# Patient Record
Sex: FEMALE | Race: WHITE | NOT HISPANIC OR LATINO | Employment: FULL TIME | URBAN - METROPOLITAN AREA
[De-identification: names, ages, dates, MRNs, and addresses within clinical notes are randomized per-mention and may not be internally consistent; named-entity substitution may affect disease eponyms.]

---

## 2023-11-07 DIAGNOSIS — C73 THYROID CANCER (MULTI): Primary | ICD-10-CM

## 2023-11-07 RX ORDER — LEVOTHYROXINE SODIUM 125 UG/1
TABLET ORAL EVERY 24 HOURS
COMMUNITY
Start: 2021-12-06 | End: 2023-11-07 | Stop reason: SDUPTHER

## 2023-11-07 RX ORDER — LEVOTHYROXINE SODIUM 125 UG/1
125 TABLET ORAL
Qty: 90 TABLET | Refills: 0 | Status: SHIPPED | OUTPATIENT
Start: 2023-11-07 | End: 2023-11-13 | Stop reason: SDUPTHER

## 2023-11-11 PROBLEM — C73 THYROID CANCER (MULTI): Status: ACTIVE | Noted: 2023-11-11

## 2023-11-11 PROBLEM — E89.0 POST-SURGICAL HYPOTHYROIDISM: Status: ACTIVE | Noted: 2023-11-11

## 2023-11-11 PROBLEM — J45.20 INTERMITTENT ASTHMA WITHOUT COMPLICATION (HHS-HCC): Status: ACTIVE | Noted: 2023-11-11

## 2023-11-11 PROBLEM — E89.0 HISTORY OF TOTAL THYROIDECTOMY: Status: ACTIVE | Noted: 2023-11-11

## 2023-11-11 PROBLEM — J45.901 EXACERBATION OF ASTHMA (HHS-HCC): Status: ACTIVE | Noted: 2023-11-11

## 2023-11-11 PROBLEM — J34.89 RHINORRHEA: Status: ACTIVE | Noted: 2018-08-28

## 2023-11-11 PROBLEM — H65.193 ACUTE EFFUSION OF BOTH MIDDLE EARS: Status: ACTIVE | Noted: 2019-10-30

## 2023-11-11 PROBLEM — Z90.89 HISTORY OF TOTAL THYROIDECTOMY: Status: ACTIVE | Noted: 2023-11-11

## 2023-11-11 PROBLEM — R59.0 CERVICAL LYMPHADENOPATHY: Status: ACTIVE | Noted: 2019-10-30

## 2023-11-11 PROBLEM — G43.009 MIGRAINE WITHOUT AURA AND RESPONSIVE TO TREATMENT: Status: ACTIVE | Noted: 2019-10-30

## 2023-11-11 PROBLEM — E03.9 HYPOTHYROIDISM (ACQUIRED): Status: ACTIVE | Noted: 2023-11-11

## 2023-11-11 PROBLEM — N80.9 ENDOMETRIOSIS: Status: ACTIVE | Noted: 2023-11-11

## 2023-11-11 PROBLEM — R53.83 FATIGUE: Status: ACTIVE | Noted: 2023-11-11

## 2023-11-11 PROBLEM — R63.1 INCREASED THIRST: Status: ACTIVE | Noted: 2023-11-11

## 2023-11-11 PROBLEM — N92.6 IRREGULAR MENSTRUAL CYCLE: Status: ACTIVE | Noted: 2023-11-11

## 2023-11-11 PROBLEM — M79.7 FIBROMYALGIA: Status: ACTIVE | Noted: 2023-11-11

## 2023-11-11 PROBLEM — N60.11 DIFFUSE CYSTIC MASTOPATHY OF RIGHT BREAST: Status: ACTIVE | Noted: 2023-11-11

## 2023-11-11 PROBLEM — J45.909 ASTHMATIC BRONCHITIS (HHS-HCC): Status: ACTIVE | Noted: 2023-11-11

## 2023-11-11 PROBLEM — H92.03 OTALGIA, BILATERAL: Status: ACTIVE | Noted: 2019-10-30

## 2023-11-11 PROBLEM — E55.9 VITAMIN D DEFICIENCY: Status: ACTIVE | Noted: 2023-11-11

## 2023-11-11 PROBLEM — E04.1 THYROID NODULE: Status: ACTIVE | Noted: 2023-11-11

## 2023-11-11 PROBLEM — G47.9 SLEEP DISTURBANCE: Status: ACTIVE | Noted: 2023-11-11

## 2023-11-11 PROBLEM — R10.2 CHRONIC PELVIC PAIN IN FEMALE: Status: ACTIVE | Noted: 2023-11-11

## 2023-11-11 PROBLEM — S09.90XA INJURY OF HEAD: Status: ACTIVE | Noted: 2023-11-11

## 2023-11-11 PROBLEM — G89.29 CHRONIC PELVIC PAIN IN FEMALE: Status: ACTIVE | Noted: 2023-11-11

## 2023-11-11 PROBLEM — G43.709 CHRONIC MIGRAINE WITHOUT AURA WITHOUT STATUS MIGRAINOSUS, NOT INTRACTABLE: Status: ACTIVE | Noted: 2023-11-11

## 2023-11-11 PROBLEM — Z90.710 HISTORY OF HYSTERECTOMY: Status: ACTIVE | Noted: 2023-11-11

## 2023-11-11 PROBLEM — G47.09 OTHER INSOMNIA: Status: ACTIVE | Noted: 2023-11-11

## 2023-11-11 PROBLEM — E78.00 PURE HYPERCHOLESTEROLEMIA: Status: ACTIVE | Noted: 2023-11-11

## 2023-11-11 PROBLEM — R93.89 ABNORMAL PELVIC ULTRASOUND: Status: ACTIVE | Noted: 2023-11-11

## 2023-11-11 PROBLEM — J45.901 MODERATE ASTHMA WITH EXACERBATION (HHS-HCC): Status: ACTIVE | Noted: 2023-11-11

## 2023-11-11 PROBLEM — Z98.890 HISTORY OF TOTAL THYROIDECTOMY: Status: ACTIVE | Noted: 2023-11-11

## 2023-11-11 PROBLEM — E04.2 GOITER, NONTOXIC, MULTINODULAR: Status: ACTIVE | Noted: 2023-11-11

## 2023-11-11 PROBLEM — N63.10 LUMP OF RIGHT BREAST: Status: ACTIVE | Noted: 2023-11-11

## 2023-11-11 PROBLEM — R92.8 ABNORMAL MAMMOGRAM: Status: ACTIVE | Noted: 2023-11-11

## 2023-11-11 RX ORDER — FLUTICASONE PROPIONATE 50 MCG
1 SPRAY, SUSPENSION (ML) NASAL DAILY
COMMUNITY
End: 2024-05-14 | Stop reason: WASHOUT

## 2023-11-11 RX ORDER — BECLOMETHASONE DIPROPIONATE HFA 40 UG/1
1 AEROSOL, METERED RESPIRATORY (INHALATION)
COMMUNITY
Start: 2021-12-17

## 2023-11-11 RX ORDER — VALACYCLOVIR HYDROCHLORIDE 500 MG/1
500 TABLET, FILM COATED ORAL 2 TIMES DAILY
COMMUNITY

## 2023-11-11 RX ORDER — CYANOCOBALAMIN (VITAMIN B-12) 250 MCG
1 TABLET ORAL DAILY
COMMUNITY

## 2023-11-11 RX ORDER — LORATADINE 10 MG/1
1 TABLET ORAL DAILY
COMMUNITY

## 2023-11-11 RX ORDER — ALBUTEROL SULFATE 90 UG/1
AEROSOL, METERED RESPIRATORY (INHALATION)
COMMUNITY
End: 2024-05-14 | Stop reason: WASHOUT

## 2023-11-11 RX ORDER — IPRATROPIUM BROMIDE AND ALBUTEROL SULFATE 2.5; .5 MG/3ML; MG/3ML
3 SOLUTION RESPIRATORY (INHALATION) 4 TIMES DAILY PRN
COMMUNITY
Start: 2018-08-22

## 2023-11-11 RX ORDER — DULOXETIN HYDROCHLORIDE 60 MG/1
1 CAPSULE, DELAYED RELEASE ORAL DAILY
COMMUNITY
Start: 2021-04-06 | End: 2023-11-13 | Stop reason: ALTCHOICE

## 2023-11-11 RX ORDER — ESCITALOPRAM OXALATE 20 MG/1
20 TABLET ORAL DAILY
COMMUNITY
End: 2024-05-14 | Stop reason: WASHOUT

## 2023-11-11 RX ORDER — ACETAMINOPHEN, ASPIRIN (NSAID), AND CAFFEINE 250; 250; 65 MG/1; MG/1; MG/1
2 TABLET, FILM COATED ORAL 2 TIMES WEEKLY
COMMUNITY
Start: 2021-09-29

## 2023-11-11 RX ORDER — IBUPROFEN 200 MG
800 TABLET ORAL
COMMUNITY
Start: 2021-09-29

## 2023-11-11 RX ORDER — ALBUTEROL SULFATE 0.83 MG/ML
SOLUTION RESPIRATORY (INHALATION)
COMMUNITY
Start: 2020-12-04

## 2023-11-11 RX ORDER — CYCLOBENZAPRINE HCL 10 MG
10 TABLET ORAL NIGHTLY PRN
COMMUNITY
Start: 2018-08-22 | End: 2023-11-13 | Stop reason: ALTCHOICE

## 2023-11-11 RX ORDER — CHOLECALCIFEROL (VITAMIN D3) 50 MCG
50 TABLET ORAL
COMMUNITY
End: 2023-11-13 | Stop reason: ALTCHOICE

## 2023-11-11 RX ORDER — MOMETASONE FUROATE 50 UG/1
2 SPRAY, METERED NASAL DAILY
COMMUNITY
Start: 2021-04-08

## 2023-11-11 RX ORDER — ESCITALOPRAM OXALATE 10 MG/1
1 TABLET ORAL DAILY
COMMUNITY
End: 2024-05-14 | Stop reason: WASHOUT

## 2023-11-11 RX ORDER — NORETHINDRONE 5 MG/1
5 TABLET ORAL DAILY
COMMUNITY
End: 2024-05-14 | Stop reason: WASHOUT

## 2023-11-11 RX ORDER — ACETAMINOPHEN 325 MG/1
650 TABLET ORAL EVERY 6 HOURS PRN
COMMUNITY
Start: 2021-08-24

## 2023-11-13 ENCOUNTER — OFFICE VISIT (OUTPATIENT)
Dept: ENDOCRINOLOGY | Facility: CLINIC | Age: 38
End: 2023-11-13
Payer: COMMERCIAL

## 2023-11-13 VITALS
RESPIRATION RATE: 16 BRPM | HEART RATE: 68 BPM | WEIGHT: 190 LBS | DIASTOLIC BLOOD PRESSURE: 64 MMHG | SYSTOLIC BLOOD PRESSURE: 122 MMHG | HEIGHT: 69 IN | BODY MASS INDEX: 28.14 KG/M2

## 2023-11-13 DIAGNOSIS — E89.0 POST-SURGICAL HYPOTHYROIDISM: Primary | ICD-10-CM

## 2023-11-13 DIAGNOSIS — C73 THYROID CANCER (MULTI): ICD-10-CM

## 2023-11-13 PROCEDURE — 1036F TOBACCO NON-USER: CPT | Performed by: INTERNAL MEDICINE

## 2023-11-13 PROCEDURE — 99213 OFFICE O/P EST LOW 20 MIN: CPT | Performed by: INTERNAL MEDICINE

## 2023-11-13 RX ORDER — LEVOTHYROXINE SODIUM 125 UG/1
125 TABLET ORAL
Qty: 90 TABLET | Refills: 3 | Status: SHIPPED | OUTPATIENT
Start: 2023-11-13 | End: 2024-05-14 | Stop reason: SDUPTHER

## 2023-11-13 ASSESSMENT — ENCOUNTER SYMPTOMS
HEADACHES: 0
VOMITING: 0
COUGH: 0
FATIGUE: 0
CHILLS: 0
NAUSEA: 0
DIARRHEA: 0
FEVER: 0
SHORTNESS OF BREATH: 0
PALPITATIONS: 0

## 2023-11-13 NOTE — PROGRESS NOTES
"Subjective   Patient ID: Vonnie Velez is a 38 y.o. female who presents for Hypothyroidism and Thyroid Cancer.  HPI  Since last visit adjusted t4 to x6 in January due to high levels.  Right now dealing with URI and ran out of t4.  Feeling ok beyond uri.   Not happy at current job, regrets change.   Neck feels tight with uri    Total tx completed in August (8/23/2021) Path showed dominant nodules benign but had incidental 5 mm PTC in the isthmus as well as a + LN in the area.   10/2021 [facility] 100 mci  5/2023 ultrasound nonspecific ln's    Review of Systems   Constitutional:  Negative for chills, fatigue and fever.   Respiratory:  Negative for cough and shortness of breath.    Cardiovascular:  Negative for chest pain and palpitations.   Gastrointestinal:  Negative for diarrhea, nausea and vomiting.   Neurological:  Negative for headaches.       Objective   11/13/2023 11:04 AM    /64   Pulse 68   Resp 16   Weight 86.2 kg (190 lb)   Height 1.746 m (5' 8.75\")         Physical Exam  Constitutional:       Appearance: Normal appearance. She is overweight.   HENT:      Head: Normocephalic and atraumatic.   Neck:      Comments: No palpable thyroid gland  Cardiovascular:      Rate and Rhythm: Normal rate and regular rhythm.      Heart sounds: No murmur heard.     No gallop.   Pulmonary:      Effort: Pulmonary effort is normal.      Breath sounds: Normal breath sounds.   Abdominal:      Palpations: Abdomen is soft.      Comments: benign   Lymphadenopathy:      Cervical: No cervical adenopathy.   Neurological:      General: No focal deficit present.      Mental Status: She is alert and oriented to person, place, and time.      Deep Tendon Reflexes: Reflexes are normal and symmetric.   Psychiatric:         Behavior: Behavior is cooperative.         Assessment/Plan   Problem List Items Addressed This Visit             ICD-10-CM    Post-surgical hypothyroidism - Primary E89.0    Thyroid cancer (CMS/HCC) C73     Due for labs but " off for a week.  Will refill and continue current dose.  Labs in 4-6 weeks with tg.    Ultrasound due next year  Follow up in one year

## 2024-01-09 ENCOUNTER — LAB (OUTPATIENT)
Dept: LAB | Facility: LAB | Age: 39
End: 2024-01-09
Payer: COMMERCIAL

## 2024-01-09 DIAGNOSIS — C73 THYROID CANCER (MULTI): ICD-10-CM

## 2024-01-09 LAB
T4 FREE SERPL-MCNC: 1.01 NG/DL (ref 0.78–1.48)
TSH SERPL-ACNC: 3.85 MIU/L (ref 0.44–3.98)

## 2024-01-09 PROCEDURE — 84432 ASSAY OF THYROGLOBULIN: CPT

## 2024-01-09 PROCEDURE — 36415 COLL VENOUS BLD VENIPUNCTURE: CPT

## 2024-01-09 PROCEDURE — 84439 ASSAY OF FREE THYROXINE: CPT

## 2024-01-09 PROCEDURE — 84443 ASSAY THYROID STIM HORMONE: CPT

## 2024-01-09 PROCEDURE — 86800 THYROGLOBULIN ANTIBODY: CPT

## 2024-01-12 DIAGNOSIS — E89.0 POST-SURGICAL HYPOTHYROIDISM: Primary | ICD-10-CM

## 2024-01-12 LAB
BILL ONLY-THYROGLOBULIN: NORMAL
THYROGLOB AB SERPL-ACNC: <0.9 IU/ML (ref 0–4)
THYROGLOB SERPL-MCNC: <0.1 NG/ML (ref 1.3–31.8)
THYROGLOB SERPL-MCNC: ABNORMAL NG/ML (ref 1.3–31.8)

## 2024-05-09 ENCOUNTER — LAB (OUTPATIENT)
Dept: LAB | Facility: LAB | Age: 39
End: 2024-05-09
Payer: COMMERCIAL

## 2024-05-09 DIAGNOSIS — E89.0 POST-SURGICAL HYPOTHYROIDISM: ICD-10-CM

## 2024-05-09 LAB
T4 FREE SERPL-MCNC: 1.13 NG/DL (ref 0.78–1.48)
TSH SERPL-ACNC: 0.11 MIU/L (ref 0.44–3.98)

## 2024-05-09 PROCEDURE — 84443 ASSAY THYROID STIM HORMONE: CPT

## 2024-05-09 PROCEDURE — 36415 COLL VENOUS BLD VENIPUNCTURE: CPT

## 2024-05-09 PROCEDURE — 84439 ASSAY OF FREE THYROXINE: CPT

## 2024-05-13 ENCOUNTER — APPOINTMENT (OUTPATIENT)
Dept: ENDOCRINOLOGY | Facility: CLINIC | Age: 39
End: 2024-05-13
Payer: COMMERCIAL

## 2024-05-14 ENCOUNTER — OFFICE VISIT (OUTPATIENT)
Dept: ENDOCRINOLOGY | Facility: CLINIC | Age: 39
End: 2024-05-14
Payer: COMMERCIAL

## 2024-05-14 VITALS
SYSTOLIC BLOOD PRESSURE: 112 MMHG | RESPIRATION RATE: 16 BRPM | WEIGHT: 181.8 LBS | BODY MASS INDEX: 26.93 KG/M2 | HEART RATE: 80 BPM | DIASTOLIC BLOOD PRESSURE: 70 MMHG | HEIGHT: 69 IN

## 2024-05-14 DIAGNOSIS — C73 THYROID CANCER (MULTI): Primary | ICD-10-CM

## 2024-05-14 DIAGNOSIS — E89.0 POST-SURGICAL HYPOTHYROIDISM: ICD-10-CM

## 2024-05-14 PROCEDURE — 99213 OFFICE O/P EST LOW 20 MIN: CPT | Performed by: INTERNAL MEDICINE

## 2024-05-14 PROCEDURE — 1036F TOBACCO NON-USER: CPT | Performed by: INTERNAL MEDICINE

## 2024-05-14 RX ORDER — LEVOTHYROXINE SODIUM 112 UG/1
112 TABLET ORAL
Qty: 90 TABLET | Refills: 3 | Status: SHIPPED | OUTPATIENT
Start: 2024-05-14 | End: 2025-05-14

## 2024-05-14 ASSESSMENT — ENCOUNTER SYMPTOMS
DIARRHEA: 0
NAUSEA: 0
SHORTNESS OF BREATH: 0
PALPITATIONS: 0
FEVER: 0
VOMITING: 0
CHILLS: 0
COUGH: 0
HEADACHES: 0
FATIGUE: 0

## 2024-05-14 NOTE — PROGRESS NOTES
Endocrinology: Follow up visit  Subjective   Patient ID: Vonnie Velez is a 38 y.o. female who presents for Hypothyroidism and Thyroid Cancer.    PCP: Jn House MD    HPI  Since last visit taking t4 125 mcg daily.  Feeling a little anxious.  Has new job and much more active, down 10 lbs.  No neck complaints    Total tx completed in August (8/23/2021) Path showed dominant nodules benign but had incidental 5 mm PTC in the isthmus as well as a + LN in the area.   10/2021 [facility] 100 mci  5/2023 ultrasound nonspecific ln's        Review of Systems   Constitutional:  Negative for chills, fatigue and fever.   Respiratory:  Negative for cough and shortness of breath.    Cardiovascular:  Negative for chest pain and palpitations.   Gastrointestinal:  Negative for diarrhea, nausea and vomiting.   Neurological:  Negative for headaches.       Patient Active Problem List   Diagnosis    Abnormal mammogram    Abnormal pelvic ultrasound    Acute effusion of both middle ears    Exacerbation of asthma (HHS-HCC)    Asthmatic bronchitis (HHS-HCC)    Intermittent asthma without complication (HHS-HCC)    Moderate asthma with exacerbation (HHS-HCC)    Cervical lymphadenopathy    Chronic migraine without aura without status migrainosus, not intractable    Migraine without aura and responsive to treatment    Chronic pelvic pain in female    Fibromyalgia    Diffuse cystic mastopathy of right breast    Lump of right breast    Endometriosis    Fatigue    Goiter, nontoxic, multinodular    History of hysterectomy    History of total thyroidectomy    Hypothyroidism (acquired)    Post-surgical hypothyroidism    Increased thirst    Injury of head    Irregular menstrual cycle    Otalgia, bilateral    Other insomnia    Pure hypercholesterolemia    Rhinorrhea    Sleep disturbance    Thyroid cancer (Multi)    Thyroid nodule    Vitamin D deficiency        Home Meds:  Current Outpatient Medications   Medication Instructions    acetaminophen  (TYLENOL) 650 mg, oral, Every 6 hours PRN    albuterol 2.5 mg /3 mL (0.083 %) nebulizer solution 3 ml as needed Inhalation every 4-6 hrs prn for 90 days    aspirin-acetaminophen-caffeine (Excedrin Extra Strength) 250-250-65 mg tablet 2 tablets, oral, 2 times weekly, PRN    beclomethasone HFA (Qvar RediHaler) 40 mcg/actuation inhaler 1 puff, inhalation, 2 times daily RT,  1 puff Inhalation bid . rinse mouth after use. ok for formulary subsitution. for 90 days<BR>    cyanocobalamin (Vitamin B-12) 250 mcg tablet 1 tablet, oral, Daily    ibuprofen 800 mg, oral,  4 tablets by mouth one to two times weekly as needed as directed    ipratropium-albuteroL (Duo-Neb) 0.5-2.5 mg/3 mL nebulizer solution 3 mL, nebulization, 4 times daily PRN    levothyroxine (SYNTHROID, LEVOXYL) 125 mcg, oral, Daily before breakfast    loratadine (Claritin) 10 mg tablet 1 tablet, oral, Daily    mometasone (Nasonex) 50 mcg/actuation nasal spray 2 sprays, Each Nostril, Daily    valACYclovir (VALTREX) 500 mg, oral, 2 times daily        Allergies   Allergen Reactions    Wool Anaphylaxis and Hives    Codeine Hives and Other    Oxycodone Hcl-Oxycodone-Asa Hives    Fluconazole Unknown    Metoclopramide Unknown    Oxycodone Unknown    Tomato Other     Bumps on back of throat     Other Other        Objective   Vitals:    05/14/24 1158   BP: 112/70   Pulse: 80   Resp: 16      Vitals:    05/14/24 1158   Weight: 82.5 kg (181 lb 12.8 oz)      Body mass index is 27.04 kg/m².   Physical Exam  Constitutional:       Appearance: Normal appearance. She is overweight.   HENT:      Head: Normocephalic and atraumatic.   Neck:      Thyroid: No thyroid mass, thyromegaly or thyroid tenderness.   Cardiovascular:      Rate and Rhythm: Normal rate and regular rhythm.      Heart sounds: No murmur heard.     No gallop.   Pulmonary:      Effort: Pulmonary effort is normal.      Breath sounds: Normal breath sounds.   Abdominal:      Palpations: Abdomen is soft.      Comments:  "benign   Neurological:      General: No focal deficit present.      Mental Status: She is alert and oriented to person, place, and time.      Deep Tendon Reflexes: Reflexes are normal and symmetric.   Psychiatric:         Behavior: Behavior is cooperative.         Labs:  Lab Results   Component Value Date    TSH 0.11 (L) 05/09/2024    FREET4 1.13 05/09/2024      No results found for: \"PR1\", \"THYROIDPAB\", \"TSI\"     Assessment/Plan   Problem List Items Addressed This Visit       Post-surgical hypothyroidism    Thyroid cancer (Multi) - Primary     Discussed course and labs  With weight loss tsh now a bit over suppressed and she is feeling it a bit: will reduce to 112 mcg.   Recheck labs in 2-3 months  Also due for yearly ultrasound  Follow up in one year    Electronically signed by:  Charlene Smith MD 05/14/24 1:05 PM              "

## 2024-05-14 NOTE — PATIENT INSTRUCTIONS
Decrease thyroid med to 112 mcg daily  Recheck labs in 2-3 months  Schedule ultrasound  Follow up in one year

## 2024-05-23 ENCOUNTER — HOSPITAL ENCOUNTER (OUTPATIENT)
Dept: RADIOLOGY | Facility: CLINIC | Age: 39
Discharge: HOME | End: 2024-05-23
Payer: COMMERCIAL

## 2024-05-23 DIAGNOSIS — E89.0 POST-SURGICAL HYPOTHYROIDISM: ICD-10-CM

## 2024-05-23 PROCEDURE — 76536 US EXAM OF HEAD AND NECK: CPT

## 2024-05-23 PROCEDURE — 76536 US EXAM OF HEAD AND NECK: CPT | Performed by: RADIOLOGY

## 2025-01-29 ENCOUNTER — TELEPHONE (OUTPATIENT)
Dept: ENDOCRINOLOGY | Facility: CLINIC | Age: 40
End: 2025-01-29
Payer: COMMERCIAL

## 2025-01-29 DIAGNOSIS — C73 THYROID CANCER (MULTI): Primary | ICD-10-CM

## 2025-01-29 NOTE — TELEPHONE ENCOUNTER
Patient states that she is feeling jittery and has some nausea and not sleeping. Pt would like to have her thyroid levels checked. I see there are orders in chart do you want all those tests done?

## 2025-02-02 LAB
T4 FREE SERPL-MCNC: 1.3 NG/DL (ref 0.8–1.8)
THYROGLOB AB SERPL-ACNC: NORMAL [IU]/ML
TSH SERPL-ACNC: 0.32 MIU/L

## 2025-02-05 LAB
T4 FREE SERPL-MCNC: 1.3 NG/DL (ref 0.8–1.8)
THYROGLOB AB SERPL-ACNC: <1 IU/ML
THYROGLOB SERPL-MCNC: <0.1 NG/ML
TSH SERPL-ACNC: 0.32 MIU/L

## 2025-05-27 ENCOUNTER — APPOINTMENT (OUTPATIENT)
Facility: CLINIC | Age: 40
End: 2025-05-27
Payer: COMMERCIAL

## 2025-05-27 VITALS
RESPIRATION RATE: 16 BRPM | DIASTOLIC BLOOD PRESSURE: 70 MMHG | BODY MASS INDEX: 27.37 KG/M2 | HEART RATE: 68 BPM | SYSTOLIC BLOOD PRESSURE: 122 MMHG | HEIGHT: 69 IN | WEIGHT: 184.8 LBS

## 2025-05-27 DIAGNOSIS — C73 THYROID CANCER (MULTI): ICD-10-CM

## 2025-05-27 DIAGNOSIS — E89.0 POST-SURGICAL HYPOTHYROIDISM: Primary | ICD-10-CM

## 2025-05-27 PROCEDURE — 99213 OFFICE O/P EST LOW 20 MIN: CPT | Performed by: INTERNAL MEDICINE

## 2025-05-27 PROCEDURE — 3008F BODY MASS INDEX DOCD: CPT | Performed by: INTERNAL MEDICINE

## 2025-05-27 PROCEDURE — 1036F TOBACCO NON-USER: CPT | Performed by: INTERNAL MEDICINE

## 2025-05-27 RX ORDER — LEVOTHYROXINE SODIUM 100 UG/1
100 TABLET ORAL
Qty: 90 TABLET | Refills: 3 | Status: SHIPPED | OUTPATIENT
Start: 2025-05-27 | End: 2026-05-27

## 2025-05-27 ASSESSMENT — ENCOUNTER SYMPTOMS
HEADACHES: 0
FEVER: 0
DIARRHEA: 0
SHORTNESS OF BREATH: 0
NAUSEA: 0
FATIGUE: 0
VOMITING: 0
CHILLS: 0
COUGH: 0
PALPITATIONS: 0

## 2025-05-27 NOTE — PATIENT INSTRUCTIONS
Schedule ultrasound when able  Adjust thyroid med to 100 mcg tablet one  every day   Follow up in one year

## 2025-05-27 NOTE — PROGRESS NOTES
Endocrinology: Follow up visit  Subjective   Patient ID: Vonnie Velez is a 39 y.o. female who presents for Hypothyroidism and Thyroid Cancer.    PCP: Jn House MD    HPI  Total tx completed in August (8/23/2021) Path showed dominant nodules benign but had incidental 5 mm PTC in the isthmus as well as a + LN in the area.   10/2021 [facility] m[facility]  5/2023 ultrasound nonspecific ln's  5/2024 negative us    Since last visit doing fairly well  Taking t4 as directed 6.5 a week: reduced dose a few months ago  Stressed with job switch in the last year that didn't go well and now back to previous position    Review of Systems   Constitutional:  Negative for chills, fatigue and fever.   Respiratory:  Negative for cough and shortness of breath.    Cardiovascular:  Negative for chest pain and palpitations.   Gastrointestinal:  Negative for diarrhea, nausea and vomiting.   Neurological:  Negative for headaches.       Problem List[1]     Home Meds:  Current Outpatient Medications   Medication Instructions    acetaminophen (TYLENOL) 650 mg, Every 6 hours PRN    albuterol 2.5 mg /3 mL (0.083 %) nebulizer solution 3 ml as needed Inhalation every 4-6 hrs prn for 90 days    aspirin-acetaminophen-caffeine (Excedrin Extra Strength) 250-250-65 mg tablet 2 tablets, 2 times weekly    beclomethasone HFA (Qvar RediHaler) 40 mcg/actuation inhaler 1 puff, 2 times daily RT    cyanocobalamin (Vitamin B-12) 250 mcg tablet 1 tablet, Daily    ibuprofen 800 mg    ipratropium-albuteroL (Duo-Neb) 0.5-2.5 mg/3 mL nebulizer solution 3 mL, 4 times daily PRN    levothyroxine (SYNTHROID, LEVOXYL) 112 mcg, oral, Daily before breakfast    loratadine (Claritin) 10 mg tablet 1 tablet, Daily    mometasone (Nasonex) 50 mcg/actuation nasal spray 2 sprays, Daily    valACYclovir (VALTREX) 500 mg, 2 times daily        RX Allergies[2]     Objective   Vitals:    05/27/25 1439   BP: 122/70   Pulse: 68   Resp: 16      Vitals:    05/27/25 1439   Weight: 83.8 kg  "(184 lb 12.8 oz)      Body mass index is 27.49 kg/m².   Physical Exam  Constitutional:       Appearance: Normal appearance. She is overweight.   HENT:      Head: Normocephalic and atraumatic.   Neck:      Comments: No palpable thyroid gland  Cardiovascular:      Rate and Rhythm: Normal rate and regular rhythm.      Heart sounds: No murmur heard.     No gallop.   Pulmonary:      Effort: Pulmonary effort is normal.      Breath sounds: Normal breath sounds.   Abdominal:      Palpations: Abdomen is soft.      Comments: benign   Neurological:      General: No focal deficit present.      Mental Status: She is alert and oriented to person, place, and time.      Deep Tendon Reflexes: Reflexes are normal and symmetric.   Psychiatric:         Behavior: Behavior is cooperative.         Labs:  Lab Results   Component Value Date    TSH 0.47 05/23/2025    FREET4 1.3 02/01/2025      No results found for: \"PR1\", \"THYROIDPAB\", \"TSI\"     Assessment/Plan   Assessment & Plan  Post-surgical hypothyroidism    Tsh looks good  Switch to 100 qd  Thyroid cancer (Multi)    Due for year 4/5 , will order      Electronically signed by:  Charlene Smith MD 05/27/25 2:47 PM                   [1]   Patient Active Problem List  Diagnosis    Abnormal mammogram    Abnormal pelvic ultrasound    Acute effusion of both middle ears    Exacerbation of asthma (HHS-HCC)    Asthmatic bronchitis (HHS-HCC)    Intermittent asthma without complication (HHS-HCC)    Moderate asthma with exacerbation (HHS-HCC)    Cervical lymphadenopathy    Chronic migraine without aura without status migrainosus, not intractable    Migraine without aura and responsive to treatment    Chronic pelvic pain in female    Fibromyalgia    Diffuse cystic mastopathy of right breast    Lump of right breast    Endometriosis    Fatigue    Goiter, nontoxic, multinodular    History of hysterectomy    History of total thyroidectomy    Hypothyroidism (acquired)    Post-surgical hypothyroidism    " Increased thirst    Injury of head    Irregular menstrual cycle    Otalgia, bilateral    Other insomnia    Pure hypercholesterolemia    Rhinorrhea    Sleep disturbance    Thyroid cancer (Multi)    Thyroid nodule    Vitamin D deficiency   [2]   Allergies  Allergen Reactions    Wool Anaphylaxis and Hives    Codeine Hives and Other    Oxycodone Hcl-Oxycodone-Asa Hives    Fluconazole Unknown    Metoclopramide Unknown    Oxycodone Unknown    Tomato Other     Bumps on back of throat     Other Other

## 2025-05-29 ENCOUNTER — APPOINTMENT (OUTPATIENT)
Dept: ENDOCRINOLOGY | Facility: CLINIC | Age: 40
End: 2025-05-29
Payer: COMMERCIAL

## 2025-06-24 ENCOUNTER — HOSPITAL ENCOUNTER (OUTPATIENT)
Dept: RADIOLOGY | Facility: CLINIC | Age: 40
Discharge: HOME | End: 2025-06-24
Payer: COMMERCIAL

## 2025-06-24 DIAGNOSIS — C73 THYROID CANCER (MULTI): ICD-10-CM

## 2025-06-24 PROCEDURE — 76536 US EXAM OF HEAD AND NECK: CPT

## 2025-06-24 PROCEDURE — 76536 US EXAM OF HEAD AND NECK: CPT | Performed by: RADIOLOGY

## 2025-07-02 ENCOUNTER — HOSPITAL ENCOUNTER (OUTPATIENT)
Dept: RADIOLOGY | Facility: CLINIC | Age: 40
Discharge: HOME | End: 2025-07-02
Payer: COMMERCIAL

## 2025-07-02 ENCOUNTER — OFFICE VISIT (OUTPATIENT)
Dept: PRIMARY CARE | Facility: CLINIC | Age: 40
End: 2025-07-02
Payer: COMMERCIAL

## 2025-07-02 VITALS
SYSTOLIC BLOOD PRESSURE: 118 MMHG | HEIGHT: 69 IN | DIASTOLIC BLOOD PRESSURE: 82 MMHG | HEART RATE: 76 BPM | WEIGHT: 188 LBS | BODY MASS INDEX: 27.85 KG/M2 | OXYGEN SATURATION: 98 %

## 2025-07-02 DIAGNOSIS — Z76.89 ENCOUNTER TO ESTABLISH CARE: Primary | ICD-10-CM

## 2025-07-02 DIAGNOSIS — F41.8 ANXIETY WITH DEPRESSION: ICD-10-CM

## 2025-07-02 DIAGNOSIS — M54.2 CERVICAL SPINE PAIN: ICD-10-CM

## 2025-07-02 DIAGNOSIS — G43.709 CHRONIC MIGRAINE WITHOUT AURA WITHOUT STATUS MIGRAINOSUS, NOT INTRACTABLE: ICD-10-CM

## 2025-07-02 DIAGNOSIS — E89.0 POST-SURGICAL HYPOTHYROIDISM: ICD-10-CM

## 2025-07-02 DIAGNOSIS — M54.12 CERVICAL RADICULOPATHY: ICD-10-CM

## 2025-07-02 DIAGNOSIS — J45.20 MILD INTERMITTENT ASTHMA WITHOUT COMPLICATION (HHS-HCC): ICD-10-CM

## 2025-07-02 PROBLEM — N92.6 IRREGULAR MENSTRUAL CYCLE: Status: RESOLVED | Noted: 2023-11-11 | Resolved: 2025-07-02

## 2025-07-02 PROBLEM — R63.1 INCREASED THIRST: Status: RESOLVED | Noted: 2023-11-11 | Resolved: 2025-07-02

## 2025-07-02 PROBLEM — J34.89 RHINORRHEA: Status: RESOLVED | Noted: 2018-08-28 | Resolved: 2025-07-02

## 2025-07-02 PROBLEM — G47.9 SLEEP DISTURBANCE: Status: RESOLVED | Noted: 2023-11-11 | Resolved: 2025-07-02

## 2025-07-02 PROBLEM — R59.0 CERVICAL LYMPHADENOPATHY: Status: RESOLVED | Noted: 2019-10-30 | Resolved: 2025-07-02

## 2025-07-02 PROBLEM — G43.009 MIGRAINE WITHOUT AURA AND RESPONSIVE TO TREATMENT: Status: RESOLVED | Noted: 2019-10-30 | Resolved: 2025-07-02

## 2025-07-02 PROBLEM — R53.83 FATIGUE: Status: RESOLVED | Noted: 2023-11-11 | Resolved: 2025-07-02

## 2025-07-02 PROBLEM — N60.11 DIFFUSE CYSTIC MASTOPATHY OF RIGHT BREAST: Status: RESOLVED | Noted: 2023-11-11 | Resolved: 2025-07-02

## 2025-07-02 PROBLEM — J45.901 EXACERBATION OF ASTHMA (HHS-HCC): Status: RESOLVED | Noted: 2023-11-11 | Resolved: 2025-07-02

## 2025-07-02 PROBLEM — G47.09 OTHER INSOMNIA: Status: RESOLVED | Noted: 2023-11-11 | Resolved: 2025-07-02

## 2025-07-02 PROBLEM — J45.909 ASTHMATIC BRONCHITIS (HHS-HCC): Status: RESOLVED | Noted: 2023-11-11 | Resolved: 2025-07-02

## 2025-07-02 PROBLEM — E04.2 GOITER, NONTOXIC, MULTINODULAR: Status: RESOLVED | Noted: 2023-11-11 | Resolved: 2025-07-02

## 2025-07-02 PROBLEM — R92.8 ABNORMAL MAMMOGRAM: Status: RESOLVED | Noted: 2023-11-11 | Resolved: 2025-07-02

## 2025-07-02 PROBLEM — R93.89 ABNORMAL PELVIC ULTRASOUND: Status: RESOLVED | Noted: 2023-11-11 | Resolved: 2025-07-02

## 2025-07-02 PROBLEM — M79.7 FIBROMYALGIA: Status: RESOLVED | Noted: 2023-11-11 | Resolved: 2025-07-02

## 2025-07-02 PROBLEM — E78.00 PURE HYPERCHOLESTEROLEMIA: Status: RESOLVED | Noted: 2023-11-11 | Resolved: 2025-07-02

## 2025-07-02 PROBLEM — R10.2 CHRONIC PELVIC PAIN IN FEMALE: Status: RESOLVED | Noted: 2023-11-11 | Resolved: 2025-07-02

## 2025-07-02 PROBLEM — H92.03 OTALGIA, BILATERAL: Status: RESOLVED | Noted: 2019-10-30 | Resolved: 2025-07-02

## 2025-07-02 PROBLEM — E03.9 HYPOTHYROIDISM (ACQUIRED): Status: RESOLVED | Noted: 2023-11-11 | Resolved: 2025-07-02

## 2025-07-02 PROBLEM — S09.90XA INJURY OF HEAD: Status: RESOLVED | Noted: 2023-11-11 | Resolved: 2025-07-02

## 2025-07-02 PROBLEM — N80.9 ENDOMETRIOSIS: Status: RESOLVED | Noted: 2023-11-11 | Resolved: 2025-07-02

## 2025-07-02 PROBLEM — E04.1 THYROID NODULE: Status: RESOLVED | Noted: 2023-11-11 | Resolved: 2025-07-02

## 2025-07-02 PROBLEM — C73 THYROID CANCER (MULTI): Status: RESOLVED | Noted: 2023-11-11 | Resolved: 2025-07-02

## 2025-07-02 PROBLEM — H65.193 ACUTE EFFUSION OF BOTH MIDDLE EARS: Status: RESOLVED | Noted: 2019-10-30 | Resolved: 2025-07-02

## 2025-07-02 PROBLEM — G89.29 CHRONIC PELVIC PAIN IN FEMALE: Status: RESOLVED | Noted: 2023-11-11 | Resolved: 2025-07-02

## 2025-07-02 PROBLEM — Z87.42 HX OF OVARIAN CYST: Status: ACTIVE | Noted: 2025-07-02

## 2025-07-02 PROBLEM — N63.10 LUMP OF RIGHT BREAST: Status: RESOLVED | Noted: 2023-11-11 | Resolved: 2025-07-02

## 2025-07-02 PROCEDURE — 72050 X-RAY EXAM NECK SPINE 4/5VWS: CPT | Performed by: RADIOLOGY

## 2025-07-02 PROCEDURE — 72050 X-RAY EXAM NECK SPINE 4/5VWS: CPT

## 2025-07-02 PROCEDURE — 1036F TOBACCO NON-USER: CPT

## 2025-07-02 PROCEDURE — 3008F BODY MASS INDEX DOCD: CPT

## 2025-07-02 PROCEDURE — 99204 OFFICE O/P NEW MOD 45 MIN: CPT

## 2025-07-02 RX ORDER — SERTRALINE HYDROCHLORIDE 50 MG/1
50 TABLET, FILM COATED ORAL DAILY
Qty: 90 TABLET | Refills: 0 | Status: SHIPPED | OUTPATIENT
Start: 2025-07-02

## 2025-07-02 RX ORDER — CYCLOBENZAPRINE HCL 5 MG
5-10 TABLET ORAL NIGHTLY PRN
Qty: 30 TABLET | Refills: 0 | Status: SHIPPED | OUTPATIENT
Start: 2025-07-02 | End: 2025-08-31

## 2025-07-02 RX ORDER — SERTRALINE HYDROCHLORIDE 50 MG/1
50 TABLET, FILM COATED ORAL DAILY
COMMUNITY
End: 2025-07-02 | Stop reason: SDUPTHER

## 2025-07-02 RX ORDER — PREDNISONE 10 MG/1
TABLET ORAL
Qty: 27 TABLET | Refills: 0 | Status: SHIPPED | OUTPATIENT
Start: 2025-07-02 | End: 2025-07-12

## 2025-07-02 ASSESSMENT — ENCOUNTER SYMPTOMS
HEADACHES: 1
DEPRESSION: 0
CHILLS: 0
COUGH: 0
WHEEZING: 0
NECK PAIN: 1
FEVER: 0
NECK STIFFNESS: 1
OCCASIONAL FEELINGS OF UNSTEADINESS: 0
DYSPHORIC MOOD: 0
LOSS OF SENSATION IN FEET: 0
NERVOUS/ANXIOUS: 0
SHORTNESS OF BREATH: 0

## 2025-07-02 ASSESSMENT — PAIN SCALES - GENERAL: PAINLEVEL_OUTOF10: 4

## 2025-07-02 ASSESSMENT — PATIENT HEALTH QUESTIONNAIRE - PHQ9
1. LITTLE INTEREST OR PLEASURE IN DOING THINGS: NOT AT ALL
SUM OF ALL RESPONSES TO PHQ9 QUESTIONS 1 AND 2: 0
2. FEELING DOWN, DEPRESSED OR HOPELESS: NOT AT ALL

## 2025-07-02 NOTE — PROGRESS NOTES
"Subjective   Patient ID: Vonnie Velez is a 39 y.o. female who presents for Establish Care.    Hx Migraines, anxiety/depression, Thyroidectomy with radiation due thyroid cancer 2022, recurrent ovarian cyst, s/p full hysterectomy, genital herpes (valtrex prn), seasonal allergies    Other providers: Dr. Smith (Endocrinology)    Acquired hypothyroidism due thyroid cancer: managed by endo, almost 5 years cancer free. Recently decreased to 100 mcg levothyroxine. Last TSH 5/2025.     Asthma/allergies: patient states only issues when she has URI in asthma, otherwise never uses inhaler. Uses mometasone nasal spray and daily Claritin. Denies SOB, wheezing, cough.     Migraines usually without Aura: when occurs is debilitating, sleeps in dark room. Multiple in a week right, was seeing neurology. Has been on Triptans (multiple including Sumatriptan, rizatriptan and another), also on butalbital. Never been on CGRP before. Right now using Excedrin since lost insurance previously and fell out of care with neurologist.     Chronic neck pain after injury from work. Patient already went through worker's comp and case is closed. Has been 6-7 years since injury. In the last year, faraz last 6 months and has been having worsening neck pain and radiates into left shoulder sometimes with numbness, but pain is worse on right side. Does have stiffness of neck. Pain rated \"4.\" Currently using lidocaine patch.     Anxiety/depression: controlled on Sertraline. Happy with current dose, needs refilled. . Denies SI.        Review of Systems   Constitutional:  Negative for chills and fever.   Respiratory:  Negative for cough, shortness of breath and wheezing.    Musculoskeletal:  Positive for neck pain and neck stiffness.   Neurological:  Positive for headaches.   Psychiatric/Behavioral:  Negative for dysphoric mood and suicidal ideas. The patient is not nervous/anxious.        Objective   /82   Pulse 76   Ht 1.753 m (5' 9\")   Wt 85.3 kg " (188 lb)   SpO2 98%   BMI 27.76 kg/m²     Physical Exam  Constitutional:       General: She is not in acute distress.     Appearance: Normal appearance.   Cardiovascular:      Rate and Rhythm: Normal rate and regular rhythm.      Heart sounds: No murmur heard.  Pulmonary:      Effort: Pulmonary effort is normal.      Breath sounds: Normal breath sounds. No wheezing, rhonchi or rales.   Musculoskeletal:      Cervical back: Pain with movement, spinous process tenderness and muscular tenderness present. Decreased range of motion.   Skin:     General: Skin is warm and dry.      Findings: No rash.   Neurological:      Mental Status: She is alert.   Psychiatric:         Mood and Affect: Mood and affect normal.         Assessment/Plan   Diagnoses and all orders for this visit:  Encounter to establish care  Cervical spine pain  -     XR cervical spine complete 4-5 views; Future  -     predniSONE (Deltasone) 10 mg tablet; Take 4 tablets (40 mg) by mouth once daily for 3 days, THEN 3 tablets (30 mg) once daily for 3 days, THEN 2 tablets (20 mg) once daily for 2 days, THEN 1 tablet (10 mg) once daily for 2 days.  -     cyclobenzaprine (Flexeril) 5 mg tablet; Take 1-2 tablets (5-10 mg) by mouth as needed at bedtime for muscle spasms.  -     Referral to Physical Therapy; Future  Cervical radiculopathy  -     XR cervical spine complete 4-5 views; Future  -     predniSONE (Deltasone) 10 mg tablet; Take 4 tablets (40 mg) by mouth once daily for 3 days, THEN 3 tablets (30 mg) once daily for 3 days, THEN 2 tablets (20 mg) once daily for 2 days, THEN 1 tablet (10 mg) once daily for 2 days.  -     cyclobenzaprine (Flexeril) 5 mg tablet; Take 1-2 tablets (5-10 mg) by mouth as needed at bedtime for muscle spasms.  -     Referral to Physical Therapy; Future  Anxiety with depression  -     sertraline (Zoloft) 50 mg tablet; Take 1 tablet (50 mg) by mouth once daily.  Mild intermittent asthma without complication ([facility]-HCC)  Chronic  migraine without aura without status migrainosus, not intractable  -     rimegepant (Nurtec ODT) 75 mg tablet,disintegrating; Dissolve 1 tablet (75 mg) in the mouth once daily as needed (first sign of migraine).  Post-surgical hypothyroidism  Xray, PT, steroid, muscle relaxer ordered for neck pain. Sent in Nurtec for migraines as abortive therapy. Follow-up 1-2 months for CPE/migraines/neck pain.

## 2025-07-07 ENCOUNTER — TELEPHONE (OUTPATIENT)
Dept: PRIMARY CARE | Facility: CLINIC | Age: 40
End: 2025-07-07
Payer: COMMERCIAL

## 2025-07-07 NOTE — TELEPHONE ENCOUNTER
Xray of neck does show narrowing of disc space between your cervical vertebrae as well as mild arthritis throughout cervical spine, PT is still the next best step but if not improving with PT we will need to order MRI next.

## 2025-07-09 NOTE — TELEPHONE ENCOUNTER
Left voice message for pt to call back office in regards to xray results. Sent [facility] message to pt as well.

## 2025-07-29 ENCOUNTER — EVALUATION (OUTPATIENT)
Dept: PHYSICAL THERAPY | Facility: CLINIC | Age: 40
End: 2025-07-29
Payer: COMMERCIAL

## 2025-07-29 DIAGNOSIS — M54.2 CERVICAL SPINE PAIN: Primary | ICD-10-CM

## 2025-07-29 DIAGNOSIS — M54.12 CERVICAL RADICULOPATHY: ICD-10-CM

## 2025-07-29 PROCEDURE — 97110 THERAPEUTIC EXERCISES: CPT | Mod: GP | Performed by: PHYSICAL THERAPIST

## 2025-07-29 PROCEDURE — 97535 SELF CARE MNGMENT TRAINING: CPT | Mod: GP | Performed by: PHYSICAL THERAPIST

## 2025-07-29 PROCEDURE — 97161 PT EVAL LOW COMPLEX 20 MIN: CPT | Mod: GP | Performed by: PHYSICAL THERAPIST

## 2025-07-29 ASSESSMENT — PAIN SCALES - GENERAL
PAINLEVEL_OUTOF10: 6
PAINLEVEL_OUTOF10: 0 - NO PAIN

## 2025-07-29 ASSESSMENT — PAIN DESCRIPTION - DESCRIPTORS: DESCRIPTORS: ACHING;SHARP;OTHER (COMMENT)

## 2025-07-29 ASSESSMENT — PAIN - FUNCTIONAL ASSESSMENT: PAIN_FUNCTIONAL_ASSESSMENT: 0-10

## 2025-07-29 NOTE — PROGRESS NOTES
"      Physical Therapy  Physical Therapy Orthopedic Evaluation      Patient Name: Vonnie Velez  MRN: [id number]  Today's Date: 7/29/2025  Time Calculation  Start Time: 1740  Stop Time: 1830  Time Calculation (min): 50 min  PT Evaluation Time Entry  PT Evaluation (Low) Time Entry: 20  PT Therapeutic Procedures Time Entry  Therapeutic Exercise Time Entry: 10  Self-Care/Home Mgmt Training: 15       Insurance:    Number of Treatments Authorized: 1 of [facility]        Insurance Type: Payor: [employer] / Plan: [employer] HE[employer]AN / Product Type: *No Product type* /     Current Problem  Problem List Items Addressed This Visit           ICD-10-CM    Cervical spine pain - Primary M54.2    Relevant Orders    Follow Up In Physical Therapy    Cervical radiculopathy M54.12    Relevant Orders    Follow Up In Physical Therapy       General:  Reason for Referral: Neck Pain with cervical radiculopathy  Referred By: Rose Calvo PA-C    Past Medical History  Past Medical History Relevant to Rehab: Thyroid Cancer (2021); Concussion    Precautions:   Rehab Fall Risk: No fall risk identified    Medical History Form: Reviewed (scanned into chart)    Subjective:   Subjective   General Comment: Patient presents to physical therapy for cervical spine pain and symptoms radiating into the L UE. She notes that she has had symptoms since and accident at work >7 years ago where a hand-mariana struck her in the face/head. She does not recall neck pain prior to that event but is unsure if they are related. She notes that symptoms have progressively worsened over the past year including increased pain in the R lower cervical region radiating across to the L shoulder and radiating to the L upper arm terminating at the elbow. She notes that ~1 year ago she hit her head on a cabinet while camping. She also reports a history of headaches and migraine headaches (>2x/wk). She notes that the headaches can be \"debilitating and she has to lay down in the dark with " "mask.\" She notes that she is triggered by light but is unsure if there is a connection to the neck pain.    Onset Date: Onset Date: 07/29/24    Current Condition:   Worse    Prior Functional Level: Prior Function Per Pt/Caregiver Report  Level of [place]: Independent with ADLs and functional transfers  Vocational: Full time employment (Accounting)    Pain:  Pain Assessment: 0-10  0-10 (Numeric) Pain Score: 6 (Current; Best: 4/10; Worst: 9/10)  Pain Type: Chronic pain  Pain Location: Neck  Pain Orientation: Right, Left, Proximal, Posterior  Pain Radiating Towards: L proximal upper extremity  Pain Descriptors: Aching, Sharp, Other (Comment) (\"Pinching\")  Multiple Pain Sites: Three    Previous Interventions/Treatments/Previous Tests & Imaging: Physical Therapy Comment: Medical Management: Steroid 10 day dose pack; Muscle Relaxers; Physical Therapy    Patients Living Environment: Home Living Comment: Unremarkable    Primary Language: English    Patient's Goal(s) for Therapy: Reduce pain to assist with return to AD[facility] at [facility]    Red Flags: Do you have any of the following?         Red Flags: Numbness and Tingling    Objective:  Objective   Cervical Spine    Observation  Shoulder/Scapular/Rib Position : Increased scapular protraction  C-Spine Observation Comment: Increased cervical lordosis  C-Spine Palpation/Joint Mobility Assessment   C-Spine Palpation/Joint Mobility Assessment:: Tenderness to palpation R>L UT, LS and lower cervical ES; Joint mobility: C1-C3 UPA B: Hypomobile/Painful locally; CPA C6-T2: Hypomobile/Painful #1  Cervical AROM  Cervical flexion: (80°): 30° (\"Pulling\" #1)  Cervical extension: (50°): 45°  Cervical rotation right: (80°): 62° (#1)  Cervical rotation left: (80°): 47° (#1)  Cervical sidebend right: (45°): 30° (#1)  Cervical sidebend left: (45°): 25° (#1 > R)  Shoulder AROM  Shoulder AROM WFL: yes  Cervical Strength NT this visit     Myotomes (MMT)  Cervical Myotomes (MMT) WFL:  (5/5 Bilateral " unless noted below)  L Elbow Extension (C7 ): (5/5): 4/5  L Wrist Flexion (C7 ): (5/5): 4/5  Scapular (MMT) NT this visit     DTR  DTR WFL: yes  Dermatomes  Dermatomes WFL: yes  Special Tests  Cervical rotation < 60 degrees : Positive  Spurling’s Test: (Negative): Negative  Cervical Distraction Test: (Negative): Negative  Median Nerve ULTT: (Negative): Positive L      Outcome Measures:  Other Measures  Neck Disability Index: 34     Treatment Performed:  Therapeutic Exercise  Therapeutic Exercise Activity 1: HEP Education and demonstration with sets and reps as noted. Pt with good understanding and demonstration.    Other Activity  Other Activity 1: SCHM: Educated and demonstrated use of lumbar roll for sitting at the desk and in the car, as well monitoring of symptoms to assist with overall management during ADL's      Outpatient Education  Individual(s) Educated: Patient  Education Provided: Home Exercise Program  Patient/Caregiver Demonstrated Understanding: yes  Education Comment: Access Code: [email or web address]  URL: [email or web address]  Date: 07/29/2025  Prepared by: Panda Wylie    Exercises  - Seated Cervical Retraction  - 4-5 x daily - 7 x weekly - 2-3 sets - 10 reps  - Seated Upper Trapezius Stretch  - 2 x daily - 7 x weekly - 1 sets - 3 reps - 30 hold  - Shoulder External Rotation and Scapular Retraction with Resistance  - 2 x daily - 7 x weekly - 1 sets - 3 reps - 30 hold    Assessment:   Patient is 40 y.o. year old who presents to physical therapy with signs and symptoms consistent with neck pain with cervical radiculopathy. Patient has decreased ROM and strength limiting functional mobility and ADLs. Patient would benefit from skilled physical therapy in order to address the stated deficits and return to daily tasks with reduced pain and improved function. Patient has increasing symptoms in the cervical spine and L UE, as well possible multi-classifications of headaches. Will assess  patient's response to physical therapy overall and return to ADL's.    Personal Factors Affecting Care:   None    SINSS:  Severity: High  Irritability: High  Nature: MSK Cervical Spine  Stage: Chronic  Stability: Evolving with changing characteristics    Rehab Prognosis: Good      Plan:  Treatment/Interventions: Electrical stimulation, Education/ Instruction, Dry needling, Neuromuscular re-education, Self care/ home management, Therapeutic activities, Therapeutic exercises, Manual therapy  PT Plan: Skilled PT  PT Frequency: 2 times per week  Duration: 6 weeks  Onset Date: 07/29/24  Rehab Potential: Good    Goals: Set and discussed today  Active       PT Problem       STG       Start:  07/29/25    Expected End:  08/19/25       1) Patient will improve NDI score by 5% to show an improvement in function.  2) Patient will show an improvement in cervical rotation ROM in order to allow for improved turing head while driving.  3) Patient will be able to perform ADLs without pain exceeding 6/10 on the VAS.  4) Patient will be independent with HEP in 3 visits in order to allow for improved function with home and community tasks.           LTG       Start:  07/29/25    Expected End:  09/09/25       1) Patient will improve NDI score to </=15% to show an improvement in function at home.  2) Patient will improve scapular strength to 5/5 in order to reduce compensatory guarding in upper trapezius and greater functional use of upper extremities.   3) Patient will be able to perform ADLs without pain exceeding 4/10 on the VAS.  4) Patient will return to all work related tasks to improve QOL.               Plan of care was developed with input and agreement by the patient        Panda Wylie, PT    Ambulatory Screening Summary   Annually for patients age 55 and older and patients with life-threatening illness and more frequently at the discretion of the provider when there has been a change in patient condition/clinical  indication.   Screening  Date Last Completed   Advance Directives 7/2/2025     Annually during a primary care office visit and at the discretion of the provider when there has been a change in patient condition/clinical indication.  Depression Screening 7/2/2025   Suicide Risk Screening      Annually during any provider office visit and at the discretion of the provider when there has been a change in patient condition/clinical indication.  Alcohol Screening 7/2/2025  2:20 PM   Substance Use Screening 7/2/2025  2:20 PM    Tobacco 7/2/2025  2:20 PM     Completed at the discretion of the provider during any provider office visit or when there has been a change in patient condition/clinical indication.  Domestic Violence 7/2/2025   Human Trafficking    Spiritual/Cultural  7/2/2025   Food Insecurity Screening Social Determinants of Health     Patient Education/Key Learner 7/2/2025   Pain Screening 7/2/2025     Completed per Adult Falls Prevention (Outpatient), [id number]  Falls Risk Screening              This note was dictated with voice recognition software. It has not been proofread for grammatical errors, typographical mistakes or other semantic inconsistencies.

## 2025-08-12 ENCOUNTER — TREATMENT (OUTPATIENT)
Dept: PHYSICAL THERAPY | Facility: CLINIC | Age: 40
End: 2025-08-12
Payer: COMMERCIAL

## 2025-08-12 DIAGNOSIS — M54.12 CERVICAL RADICULOPATHY: ICD-10-CM

## 2025-08-12 DIAGNOSIS — M54.2 CERVICAL SPINE PAIN: Primary | ICD-10-CM

## 2025-08-12 PROCEDURE — 97140 MANUAL THERAPY 1/> REGIONS: CPT | Mod: GP | Performed by: PHYSICAL THERAPIST

## 2025-08-12 PROCEDURE — 97110 THERAPEUTIC EXERCISES: CPT | Mod: GP | Performed by: PHYSICAL THERAPIST

## 2025-08-14 ENCOUNTER — TREATMENT (OUTPATIENT)
Dept: PHYSICAL THERAPY | Facility: CLINIC | Age: 40
End: 2025-08-14
Payer: COMMERCIAL

## 2025-08-14 DIAGNOSIS — M54.12 CERVICAL RADICULOPATHY: ICD-10-CM

## 2025-08-14 DIAGNOSIS — M54.2 CERVICAL SPINE PAIN: Primary | ICD-10-CM

## 2025-08-14 PROCEDURE — 97110 THERAPEUTIC EXERCISES: CPT | Mod: GP | Performed by: PHYSICAL THERAPIST

## 2025-08-14 PROCEDURE — 97140 MANUAL THERAPY 1/> REGIONS: CPT | Mod: GP | Performed by: PHYSICAL THERAPIST

## 2025-08-19 ENCOUNTER — TREATMENT (OUTPATIENT)
Dept: PHYSICAL THERAPY | Facility: CLINIC | Age: 40
End: 2025-08-19
Payer: COMMERCIAL

## 2025-08-19 DIAGNOSIS — M54.2 CERVICAL SPINE PAIN: Primary | ICD-10-CM

## 2025-08-19 DIAGNOSIS — M54.12 CERVICAL RADICULOPATHY: ICD-10-CM

## 2025-08-19 DIAGNOSIS — Z12.31 ENCOUNTER FOR SCREENING MAMMOGRAM FOR BREAST CANCER: ICD-10-CM

## 2025-08-19 PROCEDURE — 97110 THERAPEUTIC EXERCISES: CPT | Mod: GP | Performed by: PHYSICAL THERAPIST

## 2025-08-19 PROCEDURE — 97140 MANUAL THERAPY 1/> REGIONS: CPT | Mod: GP | Performed by: PHYSICAL THERAPIST

## 2025-08-21 ENCOUNTER — APPOINTMENT (OUTPATIENT)
Dept: PHYSICAL THERAPY | Facility: CLINIC | Age: 40
End: 2025-08-21
Payer: COMMERCIAL

## 2025-08-21 DIAGNOSIS — M54.2 CERVICAL SPINE PAIN: Primary | ICD-10-CM

## 2025-08-21 DIAGNOSIS — M54.12 CERVICAL RADICULOPATHY: ICD-10-CM
